# Patient Record
(demographics unavailable — no encounter records)

---

## 2024-10-17 NOTE — ASSESSMENT
[FreeTextEntry1] : The patient is a 37 year old F who presents to the office for Her annual wellness examination  - Fasting labs to screen for anemia, electrolyte disturbances, DM, lipid disorders, and additional metabolic disorders - PHQ2 performed: 0 points - Immunizations: will rTO for flu shot  - Encouraged routine dental, vision, dermatology screenings & age-appropriate physical activity  - Pap smear: UTD

## 2024-10-17 NOTE — HEALTH RISK ASSESSMENT
[Fair] : ~his/her~ current health as fair  [Good] : ~his/her~  mood as  good [Yes] : Yes [Monthly or less (1 pt)] : Monthly or less (1 point) [1 or 2 (0 pts)] : 1 or 2 (0 points) [Never (0 pts)] : Never (0 points) [No] : In the past 12 months have you used drugs other than those required for medical reasons? No [No falls in past year] : Patient reported no falls in the past year [0] : 2) Feeling down, depressed, or hopeless: Not at all (0) [Patient declined mammogram] : Patient declined mammogram [Patient reported PAP Smear was normal] : Patient reported PAP Smear was normal [Patient declined bone density test] : Patient declined bone density test [Patient reported colonoscopy was normal] : Patient reported colonoscopy was normal [HIV test declined] : HIV test declined [Hepatitis C test declined] : Hepatitis C test declined [Fully functional (bathing, dressing, toileting, transferring, walking, feeding)] : Fully functional (bathing, dressing, toileting, transferring, walking, feeding) [Fully functional (using the telephone, shopping, preparing meals, housekeeping, doing laundry, using] : Fully functional and needs no help or supervision to perform IADLs (using the telephone, shopping, preparing meals, housekeeping, doing laundry, using transportation, managing medications and managing finances) [Smoke Detector] : smoke detector [Carbon Monoxide Detector] : carbon monoxide detector [Seat Belt] :  uses seat belt [Sunscreen] : uses sunscreen [PHQ-2 Negative - No further assessment needed] : PHQ-2 Negative - No further assessment needed [FreeTextEntry1] : Cold for about a week & complete checkup  [de-identified] : no [de-identified] : GYN (1/2024) & GI (8/2024) Large hiatal hernia  [Audit-CScore] : 1 [de-identified] : active with child  [de-identified] : fair  [[id number]] : 0 [Change in mental status noted] : No change in mental status noted [Reports changes in hearing] : Reports no changes in hearing [Reports changes in vision] : Reports no changes in vision [Reports changes in dental health] : Reports no changes in dental health [PapSmearDate] : 01/2024 [ColonoscopyComments] : 10 years ago  [de-identified] : Previous post-partum

## 2024-10-17 NOTE — HISTORY OF PRESENT ILLNESS
[FreeTextEntry1] : AWV//establish care  [de-identified] : The patient is a 37-year-old F who presents to the office for annual examination & to establish care with new provider.   Recent delivery. Induced at 41wga; Emergency  for depressed heart rates   PMH: hiatal hernia on omeprazole   HCM LMP: 10/7 Pap smear: 2024 Flu shot: now due Tdap:

## 2024-10-17 NOTE — COUNSELING
[AUDIT-C Screening administered and reviewed] : AUDIT-C Screening administered and reviewed [Potential consequences of obesity discussed] : Potential consequences of obesity discussed [Benefits of weight loss discussed] : Benefits of weight loss discussed [Encouraged to increase physical activity] : Encouraged to increase physical activity